# Patient Record
(demographics unavailable — no encounter records)

---

## 2025-03-06 NOTE — HISTORY OF PRESENT ILLNESS
[de-identified] : Patient here for follow up to [facility] ER visit yesterday for head injury. Per mom child was in and out of consciousness, pale, and gagging. Per dad child acting normal today, no vomiting, no complaints. [FreeTextEntry6] : New patient to practice Here for follow-up of head injury 2 days ago was in the park and apparently hit head on steps going up to slide cried immediately but shortly after that mom states turned limp and went in and out of consciousness for a few minutes by the time ENT arrived child was fine and was fine in the ED CT scan was negative and was sent home.  Child is fine this morning and slept through the night, no vomiting behaving normally, ate breakfast, No fevers, no complaints of any headaches, and parents cannot find any bruise or swelling on the head.  Child apparently bumped head Apparently at 7 months of age child had similar episode of going in and out of consciousness and being pale Had been to the ED at that time no evaluation but was told had unexplained episode and was sent home.  Previous PCP did not have any further evaluation done Mom is adamant about having blood work done to evaluate--attempted to explain that routine blood work was not can explain any of these and likely needed neuro evaluation but due to request we will send for routine labs and put in neuro evaluation

## 2025-03-06 NOTE — PHYSICAL EXAM
[Alert] : alert [NL] : clear to auscultation bilaterally [Normal S1, S2 audible] : normal S1, S2 audible [Moves All Extremities x 4] : moves all extremities x4 [Normotonic] : normotonic [Clear] : clear [Acute Distress] : no acute distress [Tenderness] : no tenderness [Ecchymosis] : no ecchymosis [Swelling] : no swelling [Traumatic] : atraumatic [FreeTextEntry1] : Very cooperative developmentally appropriate [FreeTextEntry5] : WILLEM [de-identified] : Nonfocal

## 2025-03-06 NOTE — DISCUSSION/SUMMARY
[FreeTextEntry1] : New patient status post head injury who appears fine but apparently has had second episode of altered consciousness and mom very concerned and would like further evaluation Discussed present condition and possibilities of previous episodes Observe, to get available past records and immunization records Referred to neurology and labs Follow-up as needed or for [facility]

## 2025-03-06 NOTE — HISTORY OF PRESENT ILLNESS
[de-identified] : Patient here for follow up to [facility] ER visit yesterday for head injury. Per mom child was in and out of consciousness, pale, and gagging. Per dad child acting normal today, no vomiting, no complaints. [FreeTextEntry6] : New patient to practice Here for follow-up of head injury 2 days ago was in the park and apparently hit head on steps going up to slide cried immediately but shortly after that mom states turned limp and went in and out of consciousness for a few minutes by the time ENT arrived child was fine and was fine in the ED CT scan was negative and was sent home.  Child is fine this morning and slept through the night, no vomiting behaving normally, ate breakfast, No fevers, no complaints of any headaches, and parents cannot find any bruise or swelling on the head.  Child apparently bumped head Apparently at 7 months of age child had similar episode of going in and out of consciousness and being pale Had been to the ED at that time no evaluation but was told had unexplained episode and was sent home.  Previous PCP did not have any further evaluation done Mom is adamant about having blood work done to evaluate--attempted to explain that routine blood work was not can explain any of these and likely needed neuro evaluation but due to request we will send for routine labs and put in neuro evaluation

## 2025-03-06 NOTE — PHYSICAL EXAM
[Alert] : alert [NL] : clear to auscultation bilaterally [Normal S1, S2 audible] : normal S1, S2 audible [Moves All Extremities x 4] : moves all extremities x4 [Normotonic] : normotonic [Clear] : clear [Acute Distress] : no acute distress [Tenderness] : no tenderness [Ecchymosis] : no ecchymosis [Swelling] : no swelling [Traumatic] : atraumatic [FreeTextEntry1] : Very cooperative developmentally appropriate [FreeTextEntry5] : WILLEM [de-identified] : Nonfocal